# Patient Record
Sex: MALE | Race: BLACK OR AFRICAN AMERICAN | Employment: UNEMPLOYED | URBAN - NONMETROPOLITAN AREA
[De-identification: names, ages, dates, MRNs, and addresses within clinical notes are randomized per-mention and may not be internally consistent; named-entity substitution may affect disease eponyms.]

---

## 2022-03-21 ENCOUNTER — HOSPITAL ENCOUNTER (EMERGENCY)
Age: 32
Discharge: HOME | End: 2022-03-21
Attending: EMERGENCY MEDICINE
Payer: MEDICAID

## 2022-03-21 VITALS
SYSTOLIC BLOOD PRESSURE: 138 MMHG | HEIGHT: 68 IN | OXYGEN SATURATION: 99 % | RESPIRATION RATE: 14 BRPM | TEMPERATURE: 98.8 F | WEIGHT: 160 LBS | DIASTOLIC BLOOD PRESSURE: 94 MMHG | HEART RATE: 63 BPM | BODY MASS INDEX: 24.25 KG/M2

## 2022-03-21 DIAGNOSIS — G56.01 CARPAL TUNNEL SYNDROME OF RIGHT WRIST: Primary | ICD-10-CM

## 2022-03-21 PROCEDURE — 99283 EMERGENCY DEPT VISIT LOW MDM: CPT

## 2022-03-21 RX ORDER — TRAZODONE HYDROCHLORIDE 50 MG/1
50 TABLET ORAL NIGHTLY
COMMUNITY

## 2022-03-21 ASSESSMENT — PAIN SCALES - GENERAL: PAINLEVEL_OUTOF10: 8

## 2022-03-21 ASSESSMENT — PAIN DESCRIPTION - ONSET: ONSET: ON-GOING

## 2022-03-21 ASSESSMENT — PAIN DESCRIPTION - LOCATION: LOCATION: ARM

## 2022-03-21 ASSESSMENT — PAIN DESCRIPTION - FREQUENCY: FREQUENCY: CONTINUOUS

## 2022-03-21 ASSESSMENT — PAIN DESCRIPTION - PAIN TYPE: TYPE: ACUTE PAIN

## 2022-03-21 NOTE — ED PROVIDER NOTES
Triage Chief Complaint:   Arm Pain    [place]:  Christian Mckeon is a 27 y.o. male that presents to the ED with tingling throughout his fingers mainly on the index middle finger. 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 He has no voiding symptoms currently. Patient is not hallucinating. Denies any falls or trauma no shoulder pain. Past Medical History:   Diagnosis Date    Anxiety     Depressed     Paranoid schizophrenia ([facility] [address]     PTSD (post-traumatic stress disorder)      History reviewed. No pertinent surgical history. History reviewed. No pertinent family history. Social History     Socioeconomic History    Marital status: Single     Spouse name: Not on file    Number of children: Not on file    Years of education: Not on file    Highest education level: Not on file   Occupational History    Not on file   Tobacco Use    Smoking status: Current Every Day Smoker     Packs/day: 0.50     Years: 10.00     Pack years: 5.00    Smokeless tobacco: Never Used   Vaping Use    Vaping Use: Never used   Substance and Sexual Activity    Alcohol use: Not Currently    Drug use: Not Currently    Sexual activity: Yes   Other Topics Concern    Not on file   Social History Narrative    Not on file     Social Determinants of Health     Financial Resource Strain:     Difficulty of Paying Living Expenses: Not on file   Food Insecurity:     Worried About Running Out of Food in the Last Year: Not on file    Patrica of Food in the Last Year: Not on file   Transportation Needs:     Lack of Transportation (Medical): Not on file    Lack of Transportation (Non-Medical):  Not on file   Physical Activity:     Days of Exercise per Week: Not on file    Minutes of Exercise per Session: Not on file   Stress:     Feeling of Stress : Not on file   Social Connections:     Frequency of Communication with Friends and Family: Not on file    Frequency of Social Gatherings with Friends and Family: Not on file    Attends Latter day Services: Not on file    Active Member of Clubs or Organizations: Not on file    Attends Club or Organization Meetings: Not on file    Marital Status: Not on file   Intimate Partner Violence:     Fear of Current or Ex-Partner: Not on file    Emotionally Abused: Not on file    Physically Abused: Not on file    Sexually Abused: Not on file   Housing Stability:     Unable to Pay for Housing in the Last Year: Not on file    Number of [place] in the Last Year: Not on file    Unstable Housing in the Last Year: Not on file     No current facility-administered medications for this encounter. Current Outpatient Medications   Medication Sig Dispense Refill    traZODone (DESYREL) 50 MG tablet Take 50 mg by mouth nightly      risperiDONE (RISPERDAL M-TAB PO) Take by mouth Pt unsure on dose      Divalproex Sodium (DEPAKOTE PO) Take by mouth in the morning and at bedtime UNSURE OF DOSE       No Known Allergies      ROS:    Review of Systems   Neurological: Positive for numbness. Negative for dizziness, tremors, seizures, syncope, speech difficulty, weakness, light-headedness and headaches. Psychiatric/Behavioral: Negative. All other systems reviewed and are negative. Nursing Notes Reviewed    Physical Exam:      ED Triage Vitals [03/21/22 1615]   Enc Vitals Group      BP (!) 138/94      Pulse 63      Resp 14      Temp 98.8 °F (37.1 °C)      Temp Source Oral      SpO2 99 %      Weight 160 lb (72.6 kg)      Height 5' 8\" (1.727 m)      Head Circumference       Peak Flow       Pain Score       Pain Loc       Pain Edu? Excl. in [address]? Physical Exam  Vitals and nursing note reviewed. Constitutional:       Appearance: He is well-developed. HENT:      Head: Normocephalic and atraumatic. Eyes:      Pupils: Pupils are equal, round, and reactive to light. Musculoskeletal:         General: No swelling, tenderness, deformity or signs of injury. Normal range of motion. Cervical back: Normal range of motion and neck supple. Right lower leg: No edema. Left lower leg: No edema. Skin:     General: Skin is warm and dry. Neurological:      Mental Status: He is alert and oriented to person, place, and time. Sensory: Sensory deficit present. Motor: No weakness. Gait: Gait normal.      Deep Tendon Reflexes: Reflexes normal.      Comments: Decreased light touch sensation over the median nerve negative Tinel's sign no weakness on the radial median nerve   Psychiatric:         Mood and Affect: Mood normal.         Behavior: Behavior normal.         I have reviewed and interpreted all of the currently available lab results from this visit (ifapplicable):  No results found for this visit on 03/21/22. Radiographs (if obtained):  [] The following radiograph wasinterpreted by myself in the absence of a radiologist:   [] Radiologist's Report Reviewed:  No orders to display         EKG (if obtained): (All EKG's are interpreted by myself in the absence of a cardiologist)    Chart review shows recent radiographs:  No results found. MDM:      Patient has subacute right hand median nerve paresthesias suspect developing carpal tunnel due to position and occupation treat will be supportive instructions given no need for surgical referral      My typical dicussion, presentation, and considerations for this patients' chief complaint, diagnosis, differential diagnosis, medications, medication use, medication safety and medication interactions have been explained and outlined to this patient for this patient encounter.  I have stressed need for follow up and reexamination for this encounter and or return to the emergency department if any changes or any concern. I have discussed the findings of today's workup with the patient and present family members and have addressed their questions and concerns. Important warning signs as well as new or worsening symptoms which would necessitate immediate return to the ED were discussed. The plan is to discharge from the ED at this time, and the patient is in stable condition. The patient acknowledged understanding is agreeable with this plan. The patient and/or family and I have discussed the diagnosis and risks, and we agree with discharging home to follow-up with their primary care, specialist or referral doctor. Questions addressed. Disposition and follow-up agreed upon. Specific discharge instructions explained. We have discussed the symptoms which are most concerning that necessitate immediate return. We also discussed returning to the Emergency Department immediately if new or worsening symptoms occur. Clinical Impression:  1. Carpal tunnel syndrome of right wrist      Disposition referral (if applicable):  [address] [address] [address]  [phone]  Go in 1 week  If symptoms worsen    Disposition medications (if applicable):  New Prescriptions    No medications on file           Yo Castellon DO, FACEP      Comment: Please note this report has been produced using speech recognition software and maycontain errors related to that system including errors in grammar, punctuation, and spelling, as well as words and phrases that may be inappropriate. If there are any questions or concerns please feel free to contact thedictating provider for clarification.         Abby Mahmood DO  03/21/22 4105

## 2022-03-21 NOTE — ED TRIAGE NOTES
Pt states he has had tingling in his right arm for two weeks, he states his arm started having a burning sensation earlier today as well